# Patient Record
Sex: MALE | Race: WHITE | NOT HISPANIC OR LATINO | URBAN - METROPOLITAN AREA
[De-identification: names, ages, dates, MRNs, and addresses within clinical notes are randomized per-mention and may not be internally consistent; named-entity substitution may affect disease eponyms.]

---

## 2024-03-18 ENCOUNTER — LAB (OUTPATIENT)
Dept: URBAN - METROPOLITAN AREA MEDICAL CENTER 12 | Facility: MEDICAL CENTER | Age: 54
End: 2024-03-18
Payer: COMMERCIAL

## 2024-03-18 DIAGNOSIS — R93.3 ABN FINDINGS-GI TRACT: ICD-10-CM

## 2024-03-18 DIAGNOSIS — R10.84 ABDOMINAL CRAMPING, GENERALIZED: ICD-10-CM

## 2024-03-18 DIAGNOSIS — R19.7 ACUTE DIARRHEA: ICD-10-CM

## 2024-03-18 DIAGNOSIS — K92.1 HEMATOCHEZIA: ICD-10-CM

## 2024-03-18 PROCEDURE — 99254 IP/OBS CNSLTJ NEW/EST MOD 60: CPT | Performed by: INTERNAL MEDICINE

## 2024-03-18 PROCEDURE — 99222 1ST HOSP IP/OBS MODERATE 55: CPT | Performed by: INTERNAL MEDICINE

## 2024-03-18 PROCEDURE — G8427 DOCREV CUR MEDS BY ELIG CLIN: HCPCS | Performed by: INTERNAL MEDICINE

## 2024-03-19 ENCOUNTER — LAB (OUTPATIENT)
Dept: URBAN - METROPOLITAN AREA MEDICAL CENTER 12 | Facility: MEDICAL CENTER | Age: 54
End: 2024-03-19
Payer: COMMERCIAL

## 2024-03-19 DIAGNOSIS — K29.60 ADENOPAPILLOMATOSIS GASTRICA: ICD-10-CM

## 2024-03-19 DIAGNOSIS — K55.9 ISCHEMIC COLON: ICD-10-CM

## 2024-03-19 PROCEDURE — 45380 COLONOSCOPY AND BIOPSY: CPT | Performed by: INTERNAL MEDICINE

## 2024-03-19 PROCEDURE — 43235 EGD DIAGNOSTIC BRUSH WASH: CPT | Performed by: INTERNAL MEDICINE

## 2024-03-19 PROCEDURE — 43239 EGD BIOPSY SINGLE/MULTIPLE: CPT | Performed by: INTERNAL MEDICINE

## 2024-03-20 ENCOUNTER — LAB (OUTPATIENT)
Dept: URBAN - METROPOLITAN AREA MEDICAL CENTER 12 | Facility: MEDICAL CENTER | Age: 54
End: 2024-03-20
Payer: COMMERCIAL

## 2024-03-20 DIAGNOSIS — R19.7 ACUTE DIARRHEA: ICD-10-CM

## 2024-03-20 DIAGNOSIS — K92.1 HEMATOCHEZIA: ICD-10-CM

## 2024-03-20 DIAGNOSIS — R10.84 ABDOMINAL CRAMPING, GENERALIZED: ICD-10-CM

## 2024-03-20 PROCEDURE — 99232 SBSQ HOSP IP/OBS MODERATE 35: CPT | Performed by: INTERNAL MEDICINE

## 2024-03-21 ENCOUNTER — LAB (OUTPATIENT)
Dept: URBAN - METROPOLITAN AREA MEDICAL CENTER 12 | Facility: MEDICAL CENTER | Age: 54
End: 2024-03-21
Payer: COMMERCIAL

## 2024-03-21 DIAGNOSIS — K21.9 ACID REFLUX: ICD-10-CM

## 2024-03-21 DIAGNOSIS — R19.7 ACUTE DIARRHEA: ICD-10-CM

## 2024-03-21 DIAGNOSIS — K92.1 HEMATOCHEZIA: ICD-10-CM

## 2024-03-21 DIAGNOSIS — R10.84 ABDOMINAL CRAMPING, GENERALIZED: ICD-10-CM

## 2024-03-21 PROCEDURE — 99232 SBSQ HOSP IP/OBS MODERATE 35: CPT | Performed by: INTERNAL MEDICINE

## 2024-04-02 ENCOUNTER — TELEP (OUTPATIENT)
Dept: URBAN - METROPOLITAN AREA TELEHEALTH 2 | Facility: TELEHEALTH | Age: 54
End: 2024-04-02
Payer: COMMERCIAL

## 2024-04-02 ENCOUNTER — LAB (OUTPATIENT)
Dept: URBAN - METROPOLITAN AREA TELEHEALTH 2 | Facility: TELEHEALTH | Age: 54
End: 2024-04-02

## 2024-04-02 VITALS — HEIGHT: 66 IN | BODY MASS INDEX: 24.91 KG/M2 | WEIGHT: 155 LBS

## 2024-04-02 DIAGNOSIS — R09.A2 GLOBUS SENSATION: ICD-10-CM

## 2024-04-02 DIAGNOSIS — K59.09 CHANGE IN BOWEL MOVEMENTS INTERMITTENT CONSTIPATION. URGENCY IN THE MORNING.: ICD-10-CM

## 2024-04-02 DIAGNOSIS — K21.9 GASTROESOPHAGEAL REFLUX DISEASE, UNSPECIFIED WHETHER ESOPHAGITIS PRESENT: ICD-10-CM

## 2024-04-02 PROBLEM — 235595009: Status: ACTIVE | Noted: 2024-04-02

## 2024-04-02 PROBLEM — 82934008: Status: ACTIVE | Noted: 2024-04-02

## 2024-04-02 PROCEDURE — 99204 OFFICE O/P NEW MOD 45 MIN: CPT | Performed by: INTERNAL MEDICINE

## 2024-04-02 RX ORDER — ROSUVASTATIN CALCIUM 20 MG/1
TABLET, FILM COATED ORAL
Qty: 90 TABLET | Status: ACTIVE | COMMUNITY

## 2024-04-02 RX ORDER — LISINOPRIL 5 MG/1
TABLET ORAL
Qty: 90 TABLET | Status: ACTIVE | COMMUNITY

## 2024-04-02 RX ORDER — TADALAFIL 10 MG/1
TAKE ONE TABLET BY MOUTH ONE TIME DAILY TABLET ORAL
Qty: 90 UNSPECIFIED | Refills: 0 | Status: ACTIVE | COMMUNITY

## 2024-04-02 RX ORDER — PANTOPRAZOLE SODIUM 40 MG/1
TABLET, DELAYED RELEASE ORAL
Qty: 30 TABLET | Status: ACTIVE | COMMUNITY

## 2024-04-02 RX ORDER — TAMSULOSIN HYDROCHLORIDE 0.4 MG/1
TAKE ONE CAPSULE BY MOUTH ONE TIME DAILY CAPSULE ORAL
Qty: 90 UNSPECIFIED | Refills: 0 | Status: ACTIVE | COMMUNITY

## 2024-04-02 NOTE — HPI-TODAY'S VISIT:
This is a 53-year-old male referred by [facility] for GI consultation and a copy of this note will be sent to the referring provider.  Patient states that he has had chronic GI issues.  He did have a history of GERD status post fundoplication.  He also has a history of constipation.  Patient was admitted to [facility] on March 19, 2024 for hematochezia.  He had a negative CT angiogram.  Dr. Hilton did a EGD and colonoscopy.  EGD showed intact fundoplication and the H. pylori test was negative.  Colonoscopy showed ischemic colitis changes of the sigmoid and descending colon which were confirmed by biopsy. Pt says he was dx with gerd and chronic constipation. He says he had a Nissen fundoplication in 2006 and it did not help. Pt says he had seen ENT and GI then. Pt feels like he has a globus sensation sometimes at night. 90% of the mornings he spends time getting stuff out. Pt has seen allergy but no big issues. Pt saw an ENT- 3 yrs ago. Pt said this was in [place] and he had a procedure that they did to freeze something back in throat- did not help. No classic symptoms. Pt is on PPI BID and its not helping. Pt says he really did not pursue constipation as much. Pt goes to the bathroom about 1-2x a week for decades.

## 2024-04-02 NOTE — PHYSICAL EXAM SKIN:
no rashes , no suspicious lesions , no areas of discoloration
no cough/no diaphoresis/no chest pain/no edema/no headache/no hemoptysis/no fever/no chills/no body aches

## 2024-10-30 ENCOUNTER — WEB ENCOUNTER (OUTPATIENT)
Dept: URBAN - METROPOLITAN AREA CLINIC 98 | Facility: CLINIC | Age: 54
End: 2024-10-30

## 2024-11-19 ENCOUNTER — TELEPHONE ENCOUNTER (OUTPATIENT)
Dept: URBAN - METROPOLITAN AREA CLINIC 6 | Facility: CLINIC | Age: 54
End: 2024-11-19

## 2024-11-22 ENCOUNTER — TELEPHONE ENCOUNTER (OUTPATIENT)
Dept: URBAN - METROPOLITAN AREA CLINIC 92 | Facility: CLINIC | Age: 54
End: 2024-11-22

## 2024-12-02 ENCOUNTER — TELEPHONE ENCOUNTER (OUTPATIENT)
Dept: URBAN - METROPOLITAN AREA CLINIC 92 | Facility: CLINIC | Age: 54
End: 2024-12-02

## 2025-01-03 ENCOUNTER — TELEPHONE ENCOUNTER (OUTPATIENT)
Dept: URBAN - METROPOLITAN AREA CLINIC 92 | Facility: CLINIC | Age: 55
End: 2025-01-03

## 2025-02-11 ENCOUNTER — CLAIMS CREATED FROM THE CLAIM WINDOW (OUTPATIENT)
Dept: URBAN - METROPOLITAN AREA CLINIC 4 | Facility: CLINIC | Age: 55
End: 2025-02-11
Payer: COMMERCIAL

## 2025-02-11 ENCOUNTER — CLAIMS CREATED FROM THE CLAIM WINDOW (OUTPATIENT)
Dept: URBAN - METROPOLITAN AREA SURGERY CENTER 16 | Facility: SURGERY CENTER | Age: 55
End: 2025-02-11
Payer: COMMERCIAL

## 2025-02-11 DIAGNOSIS — K31.89 OTHER DISEASES OF STOMACH AND DUODENUM: ICD-10-CM

## 2025-02-11 DIAGNOSIS — K21.9 GASTRIC REFLUX: ICD-10-CM

## 2025-02-11 DIAGNOSIS — K21.9 ACID REFLUX: ICD-10-CM

## 2025-02-11 DIAGNOSIS — K21.9 GASTRO-ESOPHAGEAL REFLUX DISEASE WITHOUT ESOPHAGITIS: ICD-10-CM

## 2025-02-11 PROCEDURE — 00731 ANES UPR GI NDSC PX NOS: CPT | Performed by: ANESTHESIOLOGIST ASSISTANT

## 2025-02-11 PROCEDURE — 00731 ANES UPR GI NDSC PX NOS: CPT | Performed by: ANESTHESIOLOGY

## 2025-02-11 PROCEDURE — 88312 SPECIAL STAINS GROUP 1: CPT | Performed by: PATHOLOGY

## 2025-02-11 PROCEDURE — 43239 EGD BIOPSY SINGLE/MULTIPLE: CPT | Performed by: INTERNAL MEDICINE

## 2025-02-11 PROCEDURE — 88305 TISSUE EXAM BY PATHOLOGIST: CPT | Performed by: PATHOLOGY

## 2025-02-11 RX ORDER — TAMSULOSIN HYDROCHLORIDE 0.4 MG/1
TAKE ONE CAPSULE BY MOUTH ONE TIME DAILY CAPSULE ORAL
Qty: 90 UNSPECIFIED | Refills: 0 | Status: ACTIVE | COMMUNITY

## 2025-02-11 RX ORDER — TADALAFIL 10 MG/1
TAKE ONE TABLET BY MOUTH ONE TIME DAILY TABLET ORAL
Qty: 90 UNSPECIFIED | Refills: 0 | Status: ACTIVE | COMMUNITY

## 2025-02-11 RX ORDER — LISINOPRIL 5 MG/1
TABLET ORAL
Qty: 90 TABLET | Status: ACTIVE | COMMUNITY

## 2025-02-11 RX ORDER — ROSUVASTATIN CALCIUM 20 MG/1
TABLET, FILM COATED ORAL
Qty: 90 TABLET | Status: ACTIVE | COMMUNITY

## 2025-02-11 RX ORDER — PANTOPRAZOLE SODIUM 40 MG/1
TABLET, DELAYED RELEASE ORAL
Qty: 30 TABLET | Status: ACTIVE | COMMUNITY

## 2025-02-13 ENCOUNTER — OFFICE VISIT (OUTPATIENT)
Dept: URBAN - METROPOLITAN AREA CLINIC 92 | Facility: CLINIC | Age: 55
End: 2025-02-13

## 2025-02-13 RX ORDER — TADALAFIL 10 MG/1
TAKE ONE TABLET BY MOUTH ONE TIME DAILY TABLET ORAL
Qty: 90 UNSPECIFIED | Refills: 0 | Status: ACTIVE | COMMUNITY

## 2025-02-13 RX ORDER — ROSUVASTATIN CALCIUM 20 MG/1
TABLET, FILM COATED ORAL
Qty: 90 TABLET | Status: ACTIVE | COMMUNITY

## 2025-02-13 RX ORDER — TAMSULOSIN HYDROCHLORIDE 0.4 MG/1
TAKE ONE CAPSULE BY MOUTH ONE TIME DAILY CAPSULE ORAL
Qty: 90 UNSPECIFIED | Refills: 0 | Status: ACTIVE | COMMUNITY

## 2025-02-13 RX ORDER — PANTOPRAZOLE SODIUM 40 MG/1
TABLET, DELAYED RELEASE ORAL
Qty: 30 TABLET | Status: ACTIVE | COMMUNITY

## 2025-02-13 RX ORDER — LISINOPRIL 5 MG/1
TABLET ORAL
Qty: 90 TABLET | Status: ACTIVE | COMMUNITY